# Patient Record
Sex: FEMALE | Race: WHITE | NOT HISPANIC OR LATINO | Employment: UNEMPLOYED | URBAN - METROPOLITAN AREA
[De-identification: names, ages, dates, MRNs, and addresses within clinical notes are randomized per-mention and may not be internally consistent; named-entity substitution may affect disease eponyms.]

---

## 2017-04-13 ENCOUNTER — OFFICE VISIT (OUTPATIENT)
Dept: GYNECOLOGIC ONCOLOGY | Facility: CLINIC | Age: 77
End: 2017-04-13

## 2017-04-13 VITALS
TEMPERATURE: 98.4 F | WEIGHT: 127 LBS | BODY MASS INDEX: 20.41 KG/M2 | SYSTOLIC BLOOD PRESSURE: 131 MMHG | RESPIRATION RATE: 16 BRPM | DIASTOLIC BLOOD PRESSURE: 61 MMHG | HEIGHT: 66 IN | HEART RATE: 62 BPM | OXYGEN SATURATION: 95 %

## 2017-04-13 DIAGNOSIS — R87.629 ABNORMAL PAP SMEAR OF VAGINA: ICD-10-CM

## 2017-04-13 DIAGNOSIS — D07.2 VAGINAL INTRAEPITHELIAL NEOPLASIA III (VAIN III): ICD-10-CM

## 2017-04-13 DIAGNOSIS — D09.9: Primary | ICD-10-CM

## 2017-04-13 PROCEDURE — 99213 OFFICE O/P EST LOW 20 MIN: CPT | Performed by: NURSE PRACTITIONER

## 2017-04-20 ENCOUNTER — TELEPHONE (OUTPATIENT)
Dept: GYNECOLOGIC ONCOLOGY | Facility: CLINIC | Age: 77
End: 2017-04-20

## 2017-04-24 RX ORDER — FLUCONAZOLE 150 MG/1
150 TABLET ORAL ONCE
Qty: 1 TABLET | Refills: 0 | Status: SHIPPED | OUTPATIENT
Start: 2017-04-24 | End: 2017-04-24

## 2017-07-13 RX ORDER — FLUCONAZOLE 150 MG/1
TABLET ORAL
Qty: 2 TABLET | Refills: 0 | Status: SHIPPED | OUTPATIENT
Start: 2017-07-13 | End: 2019-10-18 | Stop reason: SDUPTHER

## 2017-09-25 RX ORDER — CONJUGATED ESTROGENS 0.62 MG/G
CREAM VAGINAL
Qty: 60 G | Refills: 4 | Status: SHIPPED | OUTPATIENT
Start: 2017-09-25 | End: 2020-07-17 | Stop reason: SDUPTHER

## 2017-10-24 ENCOUNTER — OFFICE VISIT (OUTPATIENT)
Dept: GYNECOLOGIC ONCOLOGY | Facility: CLINIC | Age: 77
End: 2017-10-24

## 2017-10-24 VITALS
TEMPERATURE: 98.4 F | RESPIRATION RATE: 18 BRPM | WEIGHT: 127.1 LBS | BODY MASS INDEX: 20.51 KG/M2 | HEART RATE: 62 BPM | DIASTOLIC BLOOD PRESSURE: 61 MMHG | SYSTOLIC BLOOD PRESSURE: 128 MMHG

## 2017-10-24 DIAGNOSIS — D07.2 CARCINOMA IN SITU OF VAGINA: Primary | ICD-10-CM

## 2017-10-24 PROCEDURE — 99213 OFFICE O/P EST LOW 20 MIN: CPT | Performed by: NURSE PRACTITIONER

## 2017-10-24 RX ORDER — MELATONIN
1000 DAILY
COMMUNITY

## 2017-10-24 RX ORDER — CHLORAL HYDRATE 500 MG
CAPSULE ORAL
COMMUNITY

## 2017-11-10 ENCOUNTER — TELEPHONE (OUTPATIENT)
Dept: GYNECOLOGIC ONCOLOGY | Facility: CLINIC | Age: 77
End: 2017-11-10

## 2018-04-24 ENCOUNTER — OFFICE VISIT (OUTPATIENT)
Dept: GYNECOLOGIC ONCOLOGY | Facility: CLINIC | Age: 78
End: 2018-04-24

## 2018-04-24 VITALS
RESPIRATION RATE: 16 BRPM | BODY MASS INDEX: 20.66 KG/M2 | DIASTOLIC BLOOD PRESSURE: 71 MMHG | SYSTOLIC BLOOD PRESSURE: 144 MMHG | OXYGEN SATURATION: 98 % | HEART RATE: 74 BPM | TEMPERATURE: 98.6 F | WEIGHT: 128 LBS

## 2018-04-24 DIAGNOSIS — D07.2 CARCINOMA IN SITU OF VAGINA: ICD-10-CM

## 2018-04-24 DIAGNOSIS — D07.2 VAGINAL INTRAEPITHELIAL NEOPLASIA III (VAIN III): Primary | ICD-10-CM

## 2018-04-24 PROCEDURE — 99213 OFFICE O/P EST LOW 20 MIN: CPT | Performed by: NURSE PRACTITIONER

## 2018-04-24 RX ORDER — MONTELUKAST SODIUM 10 MG/1
10 TABLET ORAL NIGHTLY
COMMUNITY

## 2018-04-25 ENCOUNTER — TELEPHONE (OUTPATIENT)
Dept: GYNECOLOGIC ONCOLOGY | Facility: CLINIC | Age: 78
End: 2018-04-25

## 2018-05-29 RX ORDER — CONJUGATED ESTROGENS 0.62 MG/G
CREAM VAGINAL
Qty: 30 G | Refills: 1 | Status: SHIPPED | OUTPATIENT
Start: 2018-05-29 | End: 2020-07-17 | Stop reason: SDUPTHER

## 2019-04-30 ENCOUNTER — OFFICE VISIT (OUTPATIENT)
Dept: GYNECOLOGIC ONCOLOGY | Facility: CLINIC | Age: 79
End: 2019-04-30

## 2019-04-30 VITALS
RESPIRATION RATE: 12 BRPM | WEIGHT: 125 LBS | HEART RATE: 68 BPM | SYSTOLIC BLOOD PRESSURE: 145 MMHG | DIASTOLIC BLOOD PRESSURE: 62 MMHG | OXYGEN SATURATION: 98 % | BODY MASS INDEX: 20.18 KG/M2

## 2019-04-30 DIAGNOSIS — D07.2 CARCINOMA IN SITU OF VAGINA: Primary | ICD-10-CM

## 2019-04-30 PROCEDURE — 99213 OFFICE O/P EST LOW 20 MIN: CPT | Performed by: NURSE PRACTITIONER

## 2019-10-14 RX ORDER — CONJUGATED ESTROGENS 0.62 MG/G
CREAM VAGINAL
Qty: 60 G | Refills: 1 | Status: SHIPPED | OUTPATIENT
Start: 2019-10-14 | End: 2020-07-17 | Stop reason: SDUPTHER

## 2019-10-18 RX ORDER — FLUCONAZOLE 150 MG/1
TABLET ORAL
Qty: 2 TABLET | Refills: 0 | Status: SHIPPED | OUTPATIENT
Start: 2019-10-18 | End: 2021-05-13

## 2020-05-12 ENCOUNTER — OFFICE VISIT (OUTPATIENT)
Dept: GYNECOLOGIC ONCOLOGY | Facility: CLINIC | Age: 80
End: 2020-05-12

## 2020-05-12 VITALS
HEART RATE: 70 BPM | TEMPERATURE: 97 F | SYSTOLIC BLOOD PRESSURE: 131 MMHG | BODY MASS INDEX: 20.5 KG/M2 | DIASTOLIC BLOOD PRESSURE: 67 MMHG | RESPIRATION RATE: 10 BRPM | WEIGHT: 127 LBS

## 2020-05-12 DIAGNOSIS — Z12.72 ENCOUNTER FOR SCREENING FOR MALIGNANT NEOPLASM OF VAGINA: ICD-10-CM

## 2020-05-12 DIAGNOSIS — R87.629 ABNORMAL PAP SMEAR OF VAGINA: ICD-10-CM

## 2020-05-12 DIAGNOSIS — D07.2 CARCINOMA IN SITU OF VAGINA: Primary | ICD-10-CM

## 2020-05-12 PROBLEM — E78.5 HYPERLIPIDEMIA: Status: ACTIVE | Noted: 2020-01-08

## 2020-05-12 PROCEDURE — 99213 OFFICE O/P EST LOW 20 MIN: CPT | Performed by: NURSE PRACTITIONER

## 2020-05-12 RX ORDER — CHOLESTYRAMINE 4 G/9G
POWDER, FOR SUSPENSION ORAL
COMMUNITY
Start: 2020-04-02 | End: 2022-05-19

## 2021-01-27 ENCOUNTER — IMMUNIZATION (OUTPATIENT)
Dept: VACCINE CLINIC | Facility: HOSPITAL | Age: 81
End: 2021-01-27

## 2021-01-27 PROCEDURE — 0001A: CPT | Performed by: INTERNAL MEDICINE

## 2021-01-27 PROCEDURE — 91300 HC SARSCOV02 VAC 30MCG/0.3ML IM: CPT | Performed by: INTERNAL MEDICINE

## 2021-02-17 ENCOUNTER — IMMUNIZATION (OUTPATIENT)
Dept: VACCINE CLINIC | Facility: HOSPITAL | Age: 81
End: 2021-02-17

## 2021-02-17 PROCEDURE — 91300 HC SARSCOV02 VAC 30MCG/0.3ML IM: CPT | Performed by: INTERNAL MEDICINE

## 2021-02-17 PROCEDURE — 0002A: CPT | Performed by: INTERNAL MEDICINE

## 2021-05-13 ENCOUNTER — OFFICE VISIT (OUTPATIENT)
Dept: GYNECOLOGIC ONCOLOGY | Facility: CLINIC | Age: 81
End: 2021-05-13

## 2021-05-13 VITALS
BODY MASS INDEX: 20.34 KG/M2 | SYSTOLIC BLOOD PRESSURE: 154 MMHG | HEART RATE: 66 BPM | DIASTOLIC BLOOD PRESSURE: 66 MMHG | TEMPERATURE: 98 F | RESPIRATION RATE: 17 BRPM | WEIGHT: 126 LBS | OXYGEN SATURATION: 95 %

## 2021-05-13 DIAGNOSIS — D07.2 CARCINOMA IN SITU OF VAGINA: Primary | ICD-10-CM

## 2021-05-13 DIAGNOSIS — N64.4 BREAST TENDERNESS: ICD-10-CM

## 2021-05-13 DIAGNOSIS — D07.2 VAGINAL INTRAEPITHELIAL NEOPLASIA III (VAIN III): ICD-10-CM

## 2021-05-13 PROCEDURE — 99213 OFFICE O/P EST LOW 20 MIN: CPT | Performed by: NURSE PRACTITIONER

## 2021-05-13 RX ORDER — CONJUGATED ESTROGENS 0.62 MG/G
CREAM VAGINAL 3 TIMES WEEKLY
Qty: 30 G | Refills: 1 | Status: SHIPPED | OUTPATIENT
Start: 2021-05-14 | End: 2022-05-25

## 2022-05-19 ENCOUNTER — OFFICE VISIT (OUTPATIENT)
Dept: GYNECOLOGIC ONCOLOGY | Facility: CLINIC | Age: 82
End: 2022-05-19

## 2022-05-19 VITALS
BODY MASS INDEX: 19.89 KG/M2 | OXYGEN SATURATION: 97 % | TEMPERATURE: 98 F | DIASTOLIC BLOOD PRESSURE: 75 MMHG | SYSTOLIC BLOOD PRESSURE: 165 MMHG | RESPIRATION RATE: 15 BRPM | HEART RATE: 71 BPM | HEIGHT: 66 IN | WEIGHT: 123.8 LBS

## 2022-05-19 DIAGNOSIS — D07.2 VAGINAL INTRAEPITHELIAL NEOPLASIA III (VAIN III): Primary | ICD-10-CM

## 2022-05-19 PROCEDURE — 99212 OFFICE O/P EST SF 10 MIN: CPT | Performed by: NURSE PRACTITIONER

## 2022-05-19 PROCEDURE — 88142 CYTOPATH C/V THIN LAYER: CPT | Performed by: NURSE PRACTITIONER

## 2022-05-19 PROCEDURE — 3015F CERV CANCER SCREEN DOCD: CPT | Performed by: NURSE PRACTITIONER

## 2022-05-25 RX ORDER — CONJUGATED ESTROGENS 0.62 MG/G
CREAM VAGINAL
Qty: 30 G | Refills: 1 | Status: SHIPPED | OUTPATIENT
Start: 2022-05-25

## 2022-05-27 ENCOUNTER — TELEPHONE (OUTPATIENT)
Dept: GYNECOLOGIC ONCOLOGY | Facility: CLINIC | Age: 82
End: 2022-05-27

## 2023-05-30 ENCOUNTER — OFFICE VISIT (OUTPATIENT)
Dept: GYNECOLOGIC ONCOLOGY | Facility: CLINIC | Age: 83
End: 2023-05-30

## 2023-05-30 VITALS
BODY MASS INDEX: 20.1 KG/M2 | SYSTOLIC BLOOD PRESSURE: 122 MMHG | OXYGEN SATURATION: 97 % | WEIGHT: 125.1 LBS | HEIGHT: 66 IN | TEMPERATURE: 98.6 F | DIASTOLIC BLOOD PRESSURE: 84 MMHG | HEART RATE: 66 BPM

## 2023-05-30 DIAGNOSIS — D07.2 CARCINOMA IN SITU OF VAGINA: Primary | ICD-10-CM

## 2023-05-30 PROCEDURE — 1160F RVW MEDS BY RX/DR IN RCRD: CPT | Performed by: NURSE PRACTITIONER

## 2023-05-30 PROCEDURE — 1159F MED LIST DOCD IN RCRD: CPT | Performed by: NURSE PRACTITIONER

## 2023-05-30 PROCEDURE — 1125F AMNT PAIN NOTED PAIN PRSNT: CPT | Performed by: NURSE PRACTITIONER

## 2023-05-30 PROCEDURE — 99212 OFFICE O/P EST SF 10 MIN: CPT | Performed by: NURSE PRACTITIONER

## 2023-05-31 LAB — REF LAB TEST METHOD: NORMAL

## 2024-05-20 ENCOUNTER — OFFICE VISIT (OUTPATIENT)
Dept: GYNECOLOGIC ONCOLOGY | Facility: CLINIC | Age: 84
End: 2024-05-20
Payer: MEDICARE

## 2024-05-20 VITALS
RESPIRATION RATE: 18 BRPM | BODY MASS INDEX: 20.15 KG/M2 | WEIGHT: 125.4 LBS | DIASTOLIC BLOOD PRESSURE: 73 MMHG | TEMPERATURE: 97.1 F | OXYGEN SATURATION: 96 % | HEIGHT: 66 IN | SYSTOLIC BLOOD PRESSURE: 136 MMHG | HEART RATE: 72 BPM

## 2024-05-20 DIAGNOSIS — N95.2 VAGINAL ATROPHY: ICD-10-CM

## 2024-05-20 DIAGNOSIS — D07.2 CARCINOMA IN SITU OF VAGINA: Primary | ICD-10-CM

## 2024-05-20 DIAGNOSIS — B37.9 CANDIDIASIS: ICD-10-CM

## 2024-05-20 PROCEDURE — 1125F AMNT PAIN NOTED PAIN PRSNT: CPT | Performed by: NURSE PRACTITIONER

## 2024-05-20 PROCEDURE — 99214 OFFICE O/P EST MOD 30 MIN: CPT | Performed by: NURSE PRACTITIONER

## 2024-05-20 RX ORDER — ZOLPIDEM TARTRATE 5 MG/1
2.5 TABLET ORAL NIGHTLY PRN
COMMUNITY
Start: 2024-05-07

## 2024-05-20 RX ORDER — NYSTATIN 100000 U/G
1 CREAM TOPICAL 2 TIMES DAILY PRN
Qty: 14 G | Refills: 0 | Status: SHIPPED | OUTPATIENT
Start: 2024-05-20 | End: 2024-05-27

## 2024-05-20 RX ORDER — MELOXICAM 7.5 MG/1
7.5 TABLET ORAL DAILY
COMMUNITY
Start: 2024-05-02

## 2024-05-20 RX ORDER — CLOBETASOL PROPIONATE 0.5 MG/G
1 CREAM TOPICAL EVERY 12 HOURS SCHEDULED
Qty: 60 G | Refills: 0 | Status: SHIPPED | OUTPATIENT
Start: 2024-05-20

## 2024-05-20 RX ORDER — MULTIPLE VITAMINS W/ MINERALS TAB 9MG-400MCG
1 TAB ORAL DAILY
COMMUNITY

## 2024-05-22 LAB — REF LAB TEST METHOD: NORMAL

## 2024-05-23 ENCOUNTER — TELEPHONE (OUTPATIENT)
Dept: GYNECOLOGIC ONCOLOGY | Facility: CLINIC | Age: 84
End: 2024-05-23
Payer: MEDICARE

## 2025-05-21 ENCOUNTER — OFFICE VISIT (OUTPATIENT)
Dept: GYNECOLOGIC ONCOLOGY | Facility: CLINIC | Age: 85
End: 2025-05-21
Payer: MEDICARE

## 2025-05-21 VITALS
OXYGEN SATURATION: 97 % | HEART RATE: 69 BPM | DIASTOLIC BLOOD PRESSURE: 93 MMHG | WEIGHT: 120.5 LBS | RESPIRATION RATE: 18 BRPM | BODY MASS INDEX: 19.37 KG/M2 | TEMPERATURE: 97.2 F | HEIGHT: 66 IN | SYSTOLIC BLOOD PRESSURE: 151 MMHG

## 2025-05-21 DIAGNOSIS — N95.2 VAGINAL ATROPHY: ICD-10-CM

## 2025-05-21 DIAGNOSIS — Z85.44 HISTORY OF CANCER OF VAGINA: ICD-10-CM

## 2025-05-21 DIAGNOSIS — K64.9 HEMORRHOIDS, UNSPECIFIED HEMORRHOID TYPE: ICD-10-CM

## 2025-05-21 DIAGNOSIS — Z01.419 WOMEN'S ANNUAL ROUTINE GYNECOLOGICAL EXAMINATION: Primary | ICD-10-CM

## 2025-05-21 PROCEDURE — 1126F AMNT PAIN NOTED NONE PRSNT: CPT | Performed by: NURSE PRACTITIONER

## 2025-05-21 PROCEDURE — 1159F MED LIST DOCD IN RCRD: CPT | Performed by: NURSE PRACTITIONER

## 2025-05-21 PROCEDURE — 1160F RVW MEDS BY RX/DR IN RCRD: CPT | Performed by: NURSE PRACTITIONER

## 2025-05-21 PROCEDURE — 99397 PER PM REEVAL EST PAT 65+ YR: CPT | Performed by: NURSE PRACTITIONER

## 2025-05-21 NOTE — PROGRESS NOTES
GYN ONCOLOGY ANNUAL WELL WOMAN VISIT      Saravanan Shaikh  [id number]  1940      Subjective   Chief Complaint: Annual Exam & follow up (Vaginal CIS surveillance)      Answers submitted by the patient for this visit:  Problem not listed (Submitted on 5/14/2025)  Chief Complaint: Other medical problem  joint pain: Yes  visual change: Yes  Onset: 1 to 5 years  Chronicity: chronic  Medications tried: Latanoprost  Additional information: Glaucoma      History of present illness:      Saravanan Shaikh is a 84 y.o. year old female who is here today for an annual exam. She is also here  for ongoing surveillance of Vaginal CIS and intermittent abnormal vaginal pap smears, see Cancer History.    She is s/p complete hysterectomy/BSO years ago for benign indications and began having abnormal vaginal pap smears in 2010, see history outlined below. She had negative pap smears for last several years, then ASCUS with HPV negative in 2022. Last year pap was negative/ negative HPV on 5-20-24. She reports she is feeling very well today and has no complaints. She denies vaginal bleeding, pelvic pain, changes in bowel or bladder function, new or concerning lesions, and breast problems. Well woman screenings managed by PCP.    -She has used premarin cream for vaginal atrophy trx for many years. Unfortunately last time she refilled, this was not covered by insurance and she paid a significantly higher price. Inquires about duration of tx for this. Not currently having any sxs.     -Last visit I gave clobetasol for vulvar itching/ irritation. She did not recall ever picking this up but denies any problems today.     - She had a right hip replacement in 7/2024.  This is doing really well.    -Reports external hemorrhoid. Occasional blood. Bowels loose at baseline. Takes metamucil daily.     Cancer History:   Oncology/Hematology History Overview Note   7/2010: Pap smear of vaginal cuff ASC-H (cannot exclude high grade  11/2010: Repeat pap  "smear HSIL  2011: HSIL  2011: Vaginoscopy with directed biopsies. 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 Pathology revealed mild dysplasia at 9 o'clock, squamous carcinoma in situ at 3 o'clock  2014: Completed Efudex treatment  2014: LSIL, HPV non 16/18+  3/24/2015: HSIL  2015: Vaginoscopy with directed biopsies. Pathology benign  2015: ASCUS  2016: HSIL  2016: Vaginoscopy with directed biopsies. Pathology benign  10/13/2016: Negative pap smear     CIS (carcinoma in situ)       Obstetric History:  OB History          3    Para   2    Term                AB        Living             SAB        IAB        Ectopic        Molar        Multiple        Live Births                   Menstrual History:     No LMP recorded. Patient has had a hysterectomy.          The current medication list and allergy list were reviewed and reconciled.     Past Medical History, Past Surgical History, Social History, Family History have been reviewed and are without significant changes except as mentioned.    Health Maintenance:    -DEXA 23, no records. Following with Dr Phillip  -Pap 2024, results were normal. Neg HPV.  -mmg: UTD, last completed summer 2024. Completes at [facility].   -colonoscopy: ~15yrs ago. Reports that her bowel was perforated at this time, no more since then.       Review of Systems   Constitutional: Negative.    Gastrointestinal: Negative.    Psychiatric/Behavioral: Negative.           Objective   Physical Exam  Vital Signs: /93   Pulse 69   Temp 97.2 °F (36.2 °C) (Temporal)   Resp 18   Ht 167.6 cm (66\")   Wt 54.7 kg (120 lb 8 oz)   SpO2 97%   BMI 19.45 kg/m²   Vitals:    25 1332   PainSc: 0-No pain           General Appearance:  alert, cooperative, " no apparent distress, appears stated age, and normal weight   Neurologic/Psych: A&O x 3, gait steady, appropriate affect   Lungs:   Clear to auscultation bilaterally; respirations regular, even, and unlabored bilaterally   Heart:  Regular rate and rhythm, no murmurs appreciated   Breasts:  Symmetrical, no masses, no lesions, and no nipple discharge   Abdomen:   Soft, non-tender, non-distended, and no organomegaly   Lymph nodes: No cervical, supraclavicular, inguinal or axillary adenopathy noted   Extremities: Normal, atraumatic; no clubbing, cyanosis, or edema    Skin: No rashes, ulcers, or suspicious lesions noted   Pelvic: External Genitalia  atrophic, without lesions, bilateral vascular varices noted on labia majora.   Vagina  is pale, atrophic.   Vaginal Cuff  Female Vaginal Cuff: smooth, intact, without visible lesions, and pap obtained  Uterus  surgically absent and no palpable masses  Ovaries  surgically absent bilaterally and without palpable masses or fullness  Rectovaginal  Female rectovaginal: deferred     ECOG score: 0                 Assessment and Plan:      - There is no evidence of disease on today's exam.  Pap smear collected.  She is understanding to call with any changes in pelvic symptoms or general GYN concerns before next follow-up.  We will plan to see her back in 1 year for another annual exam.  Provided option of following up at general GYN but she is most comfortable here.  Will plan to discuss again in future.     -discussed recommendations for hemorroid tx: continue metamucil, prep-H, tucks, epsom. She will try these. If no improvement or worsening sxs will call me for anusol cream Rx.     -Advised that she was given estrogen cream for the vaginal area due to vaginal symptoms.  Perfectly reasonable to discontinue use.  If she finds that vaginal atrophy symptoms become bothersome and she would like to restart, I specifically asked that she call me and let me know so I can send a new  prescription for generic vaginal estradiol cream that should be a more reasonable price.  Again, she does not have to take this medicine as it is for comfort and symptomatic care only.    -Encouraged to continue yearly mammograms.    -Follow-up with PCP and all specialist as scheduled.      Diagnoses and all orders for this visit:    1. Women's annual routine gynecological examination (Primary)    2. History of cancer of vagina    3. Vaginal atrophy    4. Hemorrhoids, unspecified hemorrhoid type      Pain assessment was performed today as a part of patient’s care. For patients with pain related to surgery, gynecologic malignancy or cancer treatment, the plan is as noted in the assessment/plan.  For patients with pain not related to these issues, they are to seek any further needed care from a more appropriate provider, such as PCP.    Follow-up:     Return to clinic in 1 year for Annual exam and ongoing cancer surveillance.      Electronically signed by LYN Sharif on 05/21/2025

## 2025-05-22 LAB — REF LAB TEST METHOD: NORMAL

## 2025-05-28 DIAGNOSIS — N95.2 VAGINAL ATROPHY: Primary | ICD-10-CM

## 2025-05-28 RX ORDER — ESTRADIOL 0.1 MG/G
CREAM VAGINAL
Qty: 42.5 G | Refills: 12 | Status: SHIPPED | OUTPATIENT
Start: 2025-05-28

## 2025-05-28 NOTE — PROGRESS NOTES
We received a fax from [facility] informing that patient is requesting cost saving alternative Rx for Premarin cream.  I will scanned this into EMR.  I will E scribe vaginal estradiol to [facility] pharmacy as requested. Premarin cream d/c'd.